# Patient Record
Sex: MALE | Race: WHITE | NOT HISPANIC OR LATINO | Employment: FULL TIME | URBAN - METROPOLITAN AREA
[De-identification: names, ages, dates, MRNs, and addresses within clinical notes are randomized per-mention and may not be internally consistent; named-entity substitution may affect disease eponyms.]

---

## 2017-12-04 ENCOUNTER — ALLSCRIPTS OFFICE VISIT (OUTPATIENT)
Dept: OTHER | Facility: OTHER | Age: 62
End: 2017-12-04

## 2017-12-04 DIAGNOSIS — N40.0 ENLARGED PROSTATE WITHOUT LOWER URINARY TRACT SYMPTOMS (LUTS): ICD-10-CM

## 2017-12-04 LAB
BILIRUB UR QL STRIP: NORMAL
CLARITY UR: NORMAL
COLOR UR: YELLOW
GLUCOSE (HISTORICAL): NORMAL
HGB UR QL STRIP.AUTO: NORMAL
KETONES UR STRIP-MCNC: NORMAL MG/DL
LEUKOCYTE ESTERASE UR QL STRIP: NORMAL
NITRITE UR QL STRIP: NORMAL
PH UR STRIP.AUTO: 5.5 [PH]
PROT UR STRIP-MCNC: NORMAL MG/DL
SP GR UR STRIP.AUTO: 1.02
UROBILINOGEN UR QL STRIP.AUTO: 0.2

## 2018-01-22 VITALS
DIASTOLIC BLOOD PRESSURE: 80 MMHG | SYSTOLIC BLOOD PRESSURE: 136 MMHG | WEIGHT: 205 LBS | HEIGHT: 70 IN | BODY MASS INDEX: 29.35 KG/M2

## 2018-07-17 ENCOUNTER — PROCEDURE VISIT (OUTPATIENT)
Dept: UROLOGY | Facility: MEDICAL CENTER | Age: 63
End: 2018-07-17
Payer: COMMERCIAL

## 2018-07-17 VITALS
SYSTOLIC BLOOD PRESSURE: 110 MMHG | DIASTOLIC BLOOD PRESSURE: 70 MMHG | BODY MASS INDEX: 28.63 KG/M2 | WEIGHT: 200 LBS | HEIGHT: 70 IN

## 2018-07-17 DIAGNOSIS — N13.8 BPH WITH OBSTRUCTION/LOWER URINARY TRACT SYMPTOMS: ICD-10-CM

## 2018-07-17 DIAGNOSIS — N40.1 BPH WITH OBSTRUCTION/LOWER URINARY TRACT SYMPTOMS: ICD-10-CM

## 2018-07-17 DIAGNOSIS — Z85.51 HISTORY OF BLADDER CANCER: Primary | ICD-10-CM

## 2018-07-17 DIAGNOSIS — N52.02 CORPORO-VENOUS OCCLUSIVE ERECTILE DYSFUNCTION: ICD-10-CM

## 2018-07-17 LAB
SL AMB  POCT GLUCOSE, UA: NORMAL
SL AMB LEUKOCYTE ESTERASE,UA: NORMAL
SL AMB POCT BILIRUBIN,UA: NORMAL
SL AMB POCT BLOOD,UA: NORMAL
SL AMB POCT CLARITY,UA: CLEAR
SL AMB POCT COLOR,UA: YELLOW
SL AMB POCT KETONES,UA: NORMAL
SL AMB POCT NITRITE,UA: NORMAL
SL AMB POCT PH,UA: 5
SL AMB POCT SPECIFIC GRAVITY,UA: 1.03
SL AMB POCT URINE PROTEIN: NORMAL
SL AMB POCT UROBILINOGEN: 0.2

## 2018-07-17 PROCEDURE — 88112 CYTOPATH CELL ENHANCE TECH: CPT | Performed by: PATHOLOGY

## 2018-07-17 PROCEDURE — 99214 OFFICE O/P EST MOD 30 MIN: CPT | Performed by: UROLOGY

## 2018-07-17 PROCEDURE — 52000 CYSTOURETHROSCOPY: CPT | Performed by: UROLOGY

## 2018-07-17 PROCEDURE — 81003 URINALYSIS AUTO W/O SCOPE: CPT | Performed by: UROLOGY

## 2018-07-17 RX ORDER — LISINOPRIL 2.5 MG/1
TABLET ORAL
COMMUNITY
Start: 2018-06-21

## 2018-07-17 RX ORDER — ATORVASTATIN CALCIUM 80 MG/1
TABLET, FILM COATED ORAL
COMMUNITY
Start: 2018-06-21

## 2018-07-17 RX ORDER — SULFAMETHOXAZOLE AND TRIMETHOPRIM 800; 160 MG/1; MG/1
1 TABLET ORAL EVERY 12 HOURS SCHEDULED
Qty: 4 TABLET | Refills: 0 | Status: SHIPPED | OUTPATIENT
Start: 2018-07-17 | End: 2018-07-19

## 2018-07-17 RX ORDER — METOPROLOL SUCCINATE 25 MG
TABLET, EXTENDED RELEASE 24 HR ORAL
COMMUNITY
Start: 2018-06-21

## 2018-07-17 RX ORDER — TICAGRELOR 90 MG/1
TABLET ORAL
COMMUNITY
Start: 2018-06-12

## 2018-07-17 NOTE — LETTER
July 17, 2018     Jeanette Penaloza, [address] [facility] [address]    Patient: Ade Lucas   YOB: 1955   Date of Visit: 7/17/2018       Dear Dr Donte Rashid:    Thank you for referring Oseas Langley to me for evaluation  Below are my notes for this consultation  If you have questions, please do not hesitate to call me  I look forward to following your patient along with you  Sincerely,        Jay Fish MD        CC: No Recipients  Jay Fish MD  7/17/2018  4:35 PM  Signed  Assessment/Plan:  1  History of bladder cancer-the patient underwent TURBT in 2014 October at which time high-grade papillary urothelial carcinoma with a rare focus of superficial submucosal invasion without any evidence of muscular invasion was noted  The patient underwent intravesical BCG therapy and now has been followed with yearly cystoscopy and presents for that now  He denies any gross hematuria or urinary symptoms referable to his bladder cancer  2   BPH with obstructive symptoms  The patient has an AUA symptom score of only 7  He however notes a weakened urinary stream some urinary hesitancy intermittency and postvoid dribbling with nocturia twice nightly  We did discuss alpha blockers as well as surgical intervention with lasers TURP or Urolift with the patient being a good candidate for any of these therapies  At this point in time we will hold off on introducing any therapy as he is on anticoagulation and his AUA symptom score is only 7  In a year ultrasound of the prostate will be obtained in order to evaluate prostate size cystoscopy will again be performed for bladder cancer surveillance as well as evaluation of the bladder outlet  At the present time the patient has bilobar enlargement of the lateral lobes of the prostate with slight extravesical median lobe enlargement which is not severe  He would be a good candidate for Uro lift    3  erectile dysfunction    The patient notes a decrease in libido as well as inability to maintain or obtain a good erection capable penetration  The patient and I had a long discussion concerning PDE 5 inhibitors however the patient hold a prescription for nitroglycerin which he has not used  We discussed the contraindication with nitroglycerin and he will approaches cardiologist to discontinue the nitroglycerin in order that he may try PDE 5 inhibitors  If PD 5 inhibitors are not appropriate then injection therapy and/or implant surgery will be discussed at a later date  Testosterone panel will be ordered for next years labs  No problem-specific Assessment & Plan notes found for this encounter  Diagnoses and all orders for this visit:    History of bladder cancer  -     POCT urine dip auto non-scope  -     Cystoscopy; Future  -     Cytology, urine; Future    BPH with obstruction/lower urinary tract symptoms  -     PSA Total, Diagnostic; Future  -     Comprehensive metabolic panel; Future    Corporo-venous occlusive erectile dysfunction  -     Testosterone, free, total; Future  -     Comprehensive metabolic panel; Future    Other orders  -     atorvastatin (LIPITOR) 80 mg tablet;   -     lisinopril (ZESTRIL) 2 5 mg tablet;   -     TOPROL XL 25 MG 24 hr tablet;   -     BRILINTA 90 MG;           Subjective:      Patient ID: Surekha Gardner is a 70 y o  male  Chief complaint:  History of bladder cancer and prostate enlargement with erectile dysfunction    History of present illness 15-year-old male with a past history of high-grade papillary transitional cell carcinoma invasive into the submucosa but not bladder muscle presents for cystoscopy  He denies gross hematuria dysuria frequency and urgency but notes nocturia 2-3 times per night some weakening of the urinary stream with hesitancy intermittency  The patient also notes erectile dysfunction and decreased libido requesting evaluation that regard    The patient is on anticoagulation for atrial fibrillation however he notes that he may be able to come off of that in the near future and further evaluation for BPH and erectile dysfunction will take place after that occurs  The following portions of the patient's history were reviewed and updated as appropriate: allergies, current medications, past family history, past medical history, past social history, past surgical history and problem list     Review of Systems   Constitutional: Negative  HENT: Negative  Eyes: Negative  Respiratory: Negative  Cardiovascular: Negative  Gastrointestinal: Negative  Genitourinary: Negative  Musculoskeletal: Negative  Skin: Negative  Neurological: Negative  Psychiatric/Behavioral: Negative  Objective:      /70   Ht 5' 10" (1 778 m)   Wt 90 7 kg (200 lb)   BMI 28 70 kg/m²           Physical Exam   Constitutional: He is oriented to person, place, and time  He appears well-developed and well-nourished  HENT:   Head: Atraumatic  Eyes: Conjunctivae and EOM are normal    Neck: Neck supple  Pulmonary/Chest: Effort normal  No respiratory distress  Abdominal: Soft  He exhibits no distension  There is no tenderness  There is no rebound  Genitourinary: Penis normal    Neurological: He is alert and oriented to person, place, and time  Psychiatric: He has a normal mood and affect  His behavior is normal  Judgment and thought content normal    Vitals reviewed  Cystoscopy  Date/Time: 7/17/2018 4:33 PM  Performed by: Miya Malin  Authorized by: Miya Malin     Procedure details: cystoscopy    Patient tolerance: Patient tolerated the procedure well with no immediate complications    Additional Procedure Details: Patient was placed in the lithotomy position and his urethral meatus prepped using Betadine with 2% lidocaine lubricant placed in the urethra and left indwelling for 5 minutes    Flexible cystoscopy took place under sterile conditions with the anterior urethra appearing normal without stricture lesion the prostatic urethra showing bilobar enlargement of the lateral lobes of the prostate with visual occlusion of the bladder outlet  There is slight enlargement of the median lobe but this is not severe and not intravesical   Urinary bladder is free of any intrinsic lesions or extrinsic mass compression mildly trabeculated with normal ureteral orifices with respect to position configuration and clear efflux  The patient tolerated the procedure well and will return 1 year

## 2018-07-17 NOTE — PROGRESS NOTES
IPSS Questionnaire (AUA-7): Over the past month    1)  How often have you had a sensation of not emptying your bladder completely after you finish urinating? 1 - Less than 1 time in 5   2)  How often have you had to urinate again less than two hours after you finished urinating? 1 - Less than 1 time in 5   3)  How often have you found you stopped and started again several times when you urinated? 0 - Not at all   4) How difficult have you found it to postpone urination? 1 - Less than 1 time in 5   5) How often have you had a weak urinary stream?  2 - Less than half the time   6) How often have you had to push or strain to begin urination? 1 - Less than 1 time in 5   7) How many times did you most typically get up to urinate from the time you went to bed until the time you got up in the morning?   1 - 1 time   Total Score:  7

## 2018-07-17 NOTE — PROGRESS NOTES
Assessment/Plan:  1  History of bladder cancer-the patient underwent TURBT in 2014 October at which time high-grade papillary urothelial carcinoma with a rare focus of superficial submucosal invasion without any evidence of muscular invasion was noted  The patient underwent intravesical BCG therapy and now has been followed with yearly cystoscopy and presents for that now  He denies any gross hematuria or urinary symptoms referable to his bladder cancer  2   BPH with obstructive symptoms  The patient has an AUA symptom score of only 7  He however notes a weakened urinary stream some urinary hesitancy intermittency and postvoid dribbling with nocturia twice nightly  We did discuss alpha blockers as well as surgical intervention with lasers TURP or Urolift with the patient being a good candidate for any of these therapies  At this point in time we will hold off on introducing any therapy as he is on anticoagulation and his AUA symptom score is only 7  In a year ultrasound of the prostate will be obtained in order to evaluate prostate size cystoscopy will again be performed for bladder cancer surveillance as well as evaluation of the bladder outlet  At the present time the patient has bilobar enlargement of the lateral lobes of the prostate with slight extravesical median lobe enlargement which is not severe  He would be a good candidate for Uro lift    3  erectile dysfunction  The patient notes a decrease in libido as well as inability to maintain or obtain a good erection capable penetration  The patient and I had a long discussion concerning PDE 5 inhibitors however the patient hold a prescription for nitroglycerin which he has not used  We discussed the contraindication with nitroglycerin and he will approaches cardiologist to discontinue the nitroglycerin in order that he may try PDE 5 inhibitors    If PD 5 inhibitors are not appropriate then injection therapy and/or implant surgery will be discussed at a later date  Testosterone panel will be ordered for next years labs  No problem-specific Assessment & Plan notes found for this encounter  Diagnoses and all orders for this visit:    History of bladder cancer  -     POCT urine dip auto non-scope  -     Cystoscopy; Future  -     Cytology, urine; Future    BPH with obstruction/lower urinary tract symptoms  -     PSA Total, Diagnostic; Future  -     Comprehensive metabolic panel; Future    Corporo-venous occlusive erectile dysfunction  -     Testosterone, free, total; Future  -     Comprehensive metabolic panel; Future    Other orders  -     atorvastatin (LIPITOR) 80 mg tablet;   -     lisinopril (ZESTRIL) 2 5 mg tablet;   -     TOPROL XL 25 MG 24 hr tablet;   -     BRILINTA 90 MG;           Subjective:      Patient ID: Bandar Diaz is a 70 y o  male  Chief complaint:  History of bladder cancer and prostate enlargement with erectile dysfunction    History of present illness 78-year-old male with a past history of high-grade papillary transitional cell carcinoma invasive into the submucosa but not bladder muscle presents for cystoscopy  He denies gross hematuria dysuria frequency and urgency but notes nocturia 2-3 times per night some weakening of the urinary stream with hesitancy intermittency  The patient also notes erectile dysfunction and decreased libido requesting evaluation that regard  The patient is on anticoagulation for atrial fibrillation however he notes that he may be able to come off of that in the near future and further evaluation for BPH and erectile dysfunction will take place after that occurs  The following portions of the patient's history were reviewed and updated as appropriate: allergies, current medications, past family history, past medical history, past social history, past surgical history and problem list     Review of Systems   Constitutional: Negative  HENT: Negative  Eyes: Negative  Respiratory: Negative  Cardiovascular: Negative  Gastrointestinal: Negative  Genitourinary: Negative  Musculoskeletal: Negative  Skin: Negative  Neurological: Negative  Psychiatric/Behavioral: Negative  Objective:      /70   Ht 5' 10" (1 778 m)   Wt 90 7 kg (200 lb)   BMI 28 70 kg/m²          Physical Exam   Constitutional: He is oriented to person, place, and time  He appears well-developed and well-nourished  HENT:   Head: Atraumatic  Eyes: Conjunctivae and EOM are normal    Neck: Neck supple  Pulmonary/Chest: Effort normal  No respiratory distress  Abdominal: Soft  He exhibits no distension  There is no tenderness  There is no rebound  Genitourinary: Penis normal    Neurological: He is alert and oriented to person, place, and time  Psychiatric: He has a normal mood and affect  His behavior is normal  Judgment and thought content normal    Vitals reviewed  Cystoscopy  Date/Time: 7/17/2018 4:33 PM  Performed by: Sophia Barcenas  Authorized by: Sophia Barcenas     Procedure details: cystoscopy    Patient tolerance: Patient tolerated the procedure well with no immediate complications    Additional Procedure Details: Patient was placed in the lithotomy position and his urethral meatus prepped using Betadine with 2% lidocaine lubricant placed in the urethra and left indwelling for 5 minutes  Flexible cystoscopy took place under sterile conditions with the anterior urethra appearing normal without stricture lesion the prostatic urethra showing bilobar enlargement of the lateral lobes of the prostate with visual occlusion of the bladder outlet  There is slight enlargement of the median lobe but this is not severe and not intravesical   Urinary bladder is free of any intrinsic lesions or extrinsic mass compression mildly trabeculated with normal ureteral orifices with respect to position configuration and clear efflux    The patient tolerated the procedure well and will return 1 year

## 2019-09-03 ENCOUNTER — PROCEDURE VISIT (OUTPATIENT)
Dept: UROLOGY | Facility: MEDICAL CENTER | Age: 64
End: 2019-09-03
Payer: COMMERCIAL

## 2019-09-03 VITALS — HEIGHT: 71 IN | WEIGHT: 206 LBS | BODY MASS INDEX: 28.84 KG/M2

## 2019-09-03 DIAGNOSIS — N52.03 COMBINED ARTERIAL INSUFFICIENCY AND CORPORO-VENOUS OCCLUSIVE ERECTILE DYSFUNCTION: ICD-10-CM

## 2019-09-03 DIAGNOSIS — N40.1 BPH WITH OBSTRUCTION/LOWER URINARY TRACT SYMPTOMS: Primary | ICD-10-CM

## 2019-09-03 DIAGNOSIS — Z85.51 HISTORY OF BLADDER CANCER: ICD-10-CM

## 2019-09-03 DIAGNOSIS — N13.8 BPH WITH OBSTRUCTION/LOWER URINARY TRACT SYMPTOMS: Primary | ICD-10-CM

## 2019-09-03 LAB
POST-VOID RESIDUAL VOLUME, ML POC: 59 ML
SL AMB  POCT GLUCOSE, UA: ABNORMAL
SL AMB LEUKOCYTE ESTERASE,UA: ABNORMAL
SL AMB POCT BILIRUBIN,UA: ABNORMAL
SL AMB POCT BLOOD,UA: ABNORMAL
SL AMB POCT CLARITY,UA: CLEAR
SL AMB POCT COLOR,UA: YELLOW
SL AMB POCT KETONES,UA: ABNORMAL
SL AMB POCT NITRITE,UA: ABNORMAL
SL AMB POCT PH,UA: 5
SL AMB POCT SPECIFIC GRAVITY,UA: 1.02
SL AMB POCT URINE PROTEIN: ABNORMAL
SL AMB POCT UROBILINOGEN: 0.2

## 2019-09-03 PROCEDURE — 99214 OFFICE O/P EST MOD 30 MIN: CPT | Performed by: UROLOGY

## 2019-09-03 PROCEDURE — 52000 CYSTOURETHROSCOPY: CPT | Performed by: UROLOGY

## 2019-09-03 PROCEDURE — 51798 US URINE CAPACITY MEASURE: CPT | Performed by: UROLOGY

## 2019-09-03 PROCEDURE — 81003 URINALYSIS AUTO W/O SCOPE: CPT | Performed by: UROLOGY

## 2019-09-03 PROCEDURE — 76872 US TRANSRECTAL: CPT | Performed by: UROLOGY

## 2019-09-03 PROCEDURE — 51741 ELECTRO-UROFLOWMETRY FIRST: CPT | Performed by: UROLOGY

## 2019-09-03 RX ORDER — SILDENAFIL 100 MG/1
100 TABLET, FILM COATED ORAL DAILY PRN
Qty: 10 TABLET | Refills: 0 | Status: SHIPPED | OUTPATIENT
Start: 2019-09-03

## 2019-09-03 RX ORDER — SULFAMETHOXAZOLE AND TRIMETHOPRIM 800; 160 MG/1; MG/1
1 TABLET ORAL EVERY 12 HOURS SCHEDULED
Qty: 4 TABLET | Refills: 0 | Status: SHIPPED | OUTPATIENT
Start: 2019-09-03 | End: 2019-09-05

## 2019-09-03 NOTE — PROGRESS NOTES
Cystoscopy  Date/Time: 9/3/2019 4:13 PM  Performed by: Kadeem Powell MD  Authorized by: Kadeem Powell MD     Procedure details: cystoscopy    Patient tolerance: Patient tolerated the procedure well with no immediate complications    Additional Procedure Details: The patient was placed on the examining table and his urethral meatus prepped with Betadine with 2% lidocaine lubricant instilled in the urethral lumen for 5 minutes  After 5 minutes video cystourethroscopy took place  The anterior urethra was within normal limits without stricture lesion  The prostatic urethra revealed bilobar enlargement of the lateral lobes of the prostate with visual occlusion of the bladder outlet  There was slight median lobe enlargement that did not appear to be obstructive  Urinary bladder was moderately trabeculated with a posterior wall cellule no evidence of intrinsic lesion or extrinsic mass compression  Both ureteral orifices were normal position and configuration bilaterally with clear efflux bilaterally  Approximately 4 Uro lift implants would be appropriate for this prostate  The patient tolerated cystourethroscopy without side effect or complication  His bladder was left filled with 500 cc of saline for uroflow to be performed he tolerated the procedure well    The patient was then placed in the right flank up position and transrectal ultrasound probe with a condom in lubricant was placed from the anal verge into the rectal vault  Prostate was examined and there were no abnormalities in the peripheral zone  There are no hypoechoic peripheral zone lesions  There is mild calcifications noted in the periurethral zone  There was mild median lobe enlargement but this was not severe  Transverse prostatic diameter was 48 mm with prostate size 41 cc  The patient tolerated both procedures well

## 2019-09-03 NOTE — PROGRESS NOTES
Uroflow  Date/Time: 9/3/2019 4:15 PM  Performed by: Francisco Whelan MD  Authorized by: Francisco Whelan MD   Procedure - Urodynamics:  Procedure details: uroflow          Post-procedure:     Patient tolerance: Patient tolerated procedure well with no immediate complications      Comments:      Patient voided a total of 446 5 cc with a maximum rate of 11 4 and an average rate of 4 9 cc/second  Flow rates are both sub normal   PVR was 59 cc    Flow rates are sub normal but PVRs acceptable

## 2019-09-03 NOTE — PROGRESS NOTES
Assessment/Plan:   1  BPH with lower urinary tract symptoms-the patient has an AUA symptom score of 11 has bilobar enlargement of the lateral lobes of the prostate with visual occlusion of the bladder outlet on cystoscopy with slight median lobe but not obstructive median lobe enlargement  Patient has a prostate size of approximately 41 cc and acceptable transverse diameter of the prostate with a sub normal urinary flow rate-both average and maximum within acceptable PVR of 59 cc  The patient and I had a long discussion concerning options for therapy including alpha blockade 5 alpha reductase inhibition Uro lift TURP or laser TURP  The patient is interested in Uro lift and wishes to consider this at the end of 2019  He will return for paperwork in that regard  2  Erectile dysfunction secondary to arterial in veno-occlusive dysfunction-the patient will trial sildenafil 100 mg  He was cautioned about not combining this with nitroglycerin as this could be potentially fatal   Free and total testosterone will be obtained  3  History of bladder cancer-cystourethroscopy revealed no evidence of recurrent lesions  Diagnoses and all orders for this visit:    BPH with obstruction/lower urinary tract symptoms  -     POCT urine dip auto non-scope  -     sulfamethoxazole-trimethoprim (BACTRIM DS) 800-160 mg per tablet; Take 1 tablet by mouth every 12 (twelve) hours for 2 days  -     Uroflow  -     POCT Measure PVR  -     Testosterone, free, total; Future    History of bladder cancer  -     Cystoscopy    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 dysfunction  -     Testosterone, free, total; Future  -     sildenafil (VIAGRA) 100 mg tablet; Take 1 tablet (100 mg total) by mouth daily as needed for erectile dysfunction          Subjective:     Patient ID: Julian Manzo is a 75 y o  male      HPI  57-year-old male with a history of bladder cancer presents for surveillance cystoscopy as well as for evaluation of bladder outlet obstructive symptoms noting an AUA symptom score of 11 primarily a longer slower protracted urinary stream with feelings of incomplete bladder emptying and intermittency although he does admit increasing urinary urgency and now the development of nocturia once nightly  In addition the patient complains of difficulty a getting any usable erections noting a decreased erectile rigidity early loss of erection and often difficulty even obtaining an erection  The patient does note some nighttime erectile activity during sleep  He denies dysuria gross hematuria  Patient presents for evaluation of the above  Review of Systems   Constitutional: Negative  HENT: Negative  Eyes: Negative  Respiratory: Negative  Cardiovascular: Negative  Gastrointestinal: Negative  Genitourinary: Positive for urgency  Negative for frequency  See HPI an AUA symptom score   Musculoskeletal: Negative  Skin: Negative  Neurological: Negative  Psychiatric/Behavioral: Negative  Objective:     Physical Exam   Constitutional: He is oriented to person, place, and time  He appears well-nourished  No distress  HENT:   Head: Normocephalic and atraumatic  Eyes: EOM are normal    Neck: Neck supple  Pulmonary/Chest: Effort normal  No respiratory distress  Abdominal: Soft  Genitourinary: Penis normal    Genitourinary Comments: Phallus normal without cutaneous lesions  Meatus patent normally placed  Scrotum normal without cutaneous lesions  Testes adnexa palpably normal without masses or induration  COURTNEY reveals a 40 g prostate without nodularity or tenderness no rectal masses and normal anal sphincter tone with normal anal verge  Neurological: He is alert and oriented to person, place, and time  Psychiatric: He has a normal mood and affect  His behavior is normal  Judgment and thought content normal    Vitals reviewed

## 2019-09-03 NOTE — LETTER
September 3, 2019     Diann Cornea, [address] [facility] [address]    Patient: Lui Haney   YOB: 1955   Date of Visit: 9/3/2019       Dear Dr Mike Lane:    Thank you for referring Elida Mejias to me for evaluation  Below are my notes for this consultation  If you have questions, please do not hesitate to call me  I look forward to following your patient along with you  Sincerely,        Amanda Sandoval MD        CC: No Recipients  Amanda Sandoval MD  9/3/2019  4:24 PM  Sign at close encounter  Uroflow  Date/Time: 9/3/2019 4:15 PM  Performed by: Amanda Sandoval MD  Authorized by: Amanda Sandoval MD   Procedure - Urodynamics:  Procedure details: uroflow          Post-procedure:     Patient tolerance: Patient tolerated procedure well with no immediate complications      Comments:      Patient voided a total of 446 5 cc with a maximum rate of 11 4 and an average rate of 4 9 cc/second  Flow rates are both sub normal   PVR was 59 cc  Flow rates are sub normal but PVRs acceptable            Amanda Sandoval MD  9/3/2019  4:35 PM  Sign at close encounter  Cystoscopy  Date/Time: 9/3/2019 4:13 PM  Performed by: Amanda Sandoavl MD  Authorized by: Amanda Sandoval MD     Procedure details: cystoscopy    Patient tolerance: Patient tolerated the procedure well with no immediate complications    Additional Procedure Details: The patient was placed on the examining table and his urethral meatus prepped with Betadine with 2% lidocaine lubricant instilled in the urethral lumen for 5 minutes  After 5 minutes video cystourethroscopy took place  The anterior urethra was within normal limits without stricture lesion  The prostatic urethra revealed bilobar enlargement of the lateral lobes of the prostate with visual occlusion of the bladder outlet  There was slight median lobe enlargement that did not appear to be obstructive    Urinary bladder was moderately trabeculated with a posterior wall cellule no evidence of intrinsic lesion or extrinsic mass compression  Both ureteral orifices were normal position and configuration bilaterally with clear efflux bilaterally  Approximately 4 Uro lift implants would be appropriate for this prostate  The patient tolerated cystourethroscopy without side effect or complication  His bladder was left filled with 500 cc of saline for uroflow to be performed he tolerated the procedure well    The patient was then placed in the right flank up position and transrectal ultrasound probe with a condom in lubricant was placed from the anal verge into the rectal vault  Prostate was examined and there were no abnormalities in the peripheral zone  There are no hypoechoic peripheral zone lesions  There is mild calcifications noted in the periurethral zone  There was mild median lobe enlargement but this was not severe  Transverse prostatic diameter was 48 mm with prostate size 41 cc  The patient tolerated both procedures well

## 2020-03-25 ENCOUNTER — MOHS SURGERY-ROUTINE (OUTPATIENT)
Dept: URBAN - METROPOLITAN AREA CLINIC 12 | Facility: CLINIC | Age: 65
Setting detail: DERMATOLOGY
End: 2020-03-25

## 2020-03-25 DIAGNOSIS — L57.0 ACTINIC KERATOSIS: ICD-10-CM

## 2020-03-25 PROCEDURE — 17311 MOHS 1 STAGE H/N/HF/G: CPT

## 2020-03-25 PROCEDURE — 13132 CMPLX RPR F/C/C/M/N/AX/G/H/F: CPT

## 2020-04-02 ENCOUNTER — MOHS SURGERY-ROUTINE (OUTPATIENT)
Dept: URBAN - METROPOLITAN AREA CLINIC 12 | Facility: CLINIC | Age: 65
Setting detail: DERMATOLOGY
End: 2020-04-02

## 2020-04-02 DIAGNOSIS — L71.8 OTHER ROSACEA: ICD-10-CM

## 2020-04-02 PROCEDURE — 13132 CMPLX RPR F/C/C/M/N/AX/G/H/F: CPT

## 2020-04-02 PROCEDURE — 17312 MOHS ADDL STAGE: CPT

## 2020-04-02 PROCEDURE — 17311 MOHS 1 STAGE H/N/HF/G: CPT

## 2022-10-31 ENCOUNTER — TELEPHONE (OUTPATIENT)
Dept: UROLOGY | Facility: MEDICAL CENTER | Age: 67
End: 2022-10-31

## 2022-11-09 ENCOUNTER — OFFICE VISIT (OUTPATIENT)
Dept: UROLOGY | Facility: MEDICAL CENTER | Age: 67
End: 2022-11-09

## 2022-11-09 VITALS
OXYGEN SATURATION: 95 % | BODY MASS INDEX: 28.28 KG/M2 | HEART RATE: 50 BPM | WEIGHT: 202 LBS | SYSTOLIC BLOOD PRESSURE: 142 MMHG | HEIGHT: 71 IN | DIASTOLIC BLOOD PRESSURE: 80 MMHG

## 2022-11-09 DIAGNOSIS — N13.8 BPH WITH OBSTRUCTION/LOWER URINARY TRACT SYMPTOMS: ICD-10-CM

## 2022-11-09 DIAGNOSIS — Z12.5 PROSTATE CANCER SCREENING: ICD-10-CM

## 2022-11-09 DIAGNOSIS — N52.03 COMBINED ARTERIAL INSUFFICIENCY AND CORPORO-VENOUS OCCLUSIVE ERECTILE DYSFUNCTION: Primary | ICD-10-CM

## 2022-11-09 DIAGNOSIS — Z85.51 HISTORY OF BLADDER CANCER: ICD-10-CM

## 2022-11-09 DIAGNOSIS — N40.1 BPH WITH OBSTRUCTION/LOWER URINARY TRACT SYMPTOMS: ICD-10-CM

## 2022-11-09 PROBLEM — C67.2 MALIGNANT NEOPLASM OF LATERAL WALL OF BLADDER (HCC): Status: ACTIVE | Noted: 2022-11-09

## 2022-11-09 RX ORDER — TADALAFIL 20 MG/1
20 TABLET ORAL AS NEEDED
Qty: 10 TABLET | Refills: 0 | Status: SHIPPED | OUTPATIENT
Start: 2022-11-09

## 2022-11-09 RX ORDER — LATANOPROST 50 UG/ML
SOLUTION/ DROPS OPHTHALMIC
COMMUNITY
Start: 2022-10-06

## 2022-11-09 NOTE — PROGRESS NOTES
Assessment/Plan:   1  Erectile dysfunction-patient no longer responds adequately to Viagra 100 mg as needed and will give a trial of Cialis 20 mg as needed with prescription issued  If this does not work the patient will purchase alprostadil for trial of vaso active drug injection therapy  2  BPH with obstructive symptoms-AUA symptom score is only 10 however the patient has a significantly enlarged prostate and this will be monitored  PSA is within normal limits as COURTNEY is without suspicion of malignant lesions  3  Prostate cancer screening-as above in 2     4  History of bladder cancer-plan urinary cytology and cystoscopy  No problem-specific Assessment & Plan notes found for this encounter  Diagnoses and all orders for this visit:    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 dysfunction  -     Alprostadil, Vasodilator, (PROSTAGLANDIN PGE1 INJECTABLE 20 MCG/ML, STANDARD, IJ SOLN); 1 mL by Intracavernosal route once for 1 dose  -     tadalafil (CIALIS) 20 MG tablet; Take 1 tablet (20 mg total) by mouth as needed for erectile dysfunction    BPH with obstruction/lower urinary tract symptoms    Prostate cancer screening    History of bladder cancer  -     Cystoscopy; Future  -     Cytology, urine; Future    Other orders  -     latanoprost (XALATAN) 0 005 % ophthalmic solution; INSTILL 1 DROP INTO BOTH EYES IN THE EVENING          Subjective:      Patient ID: Aleksandar Harding is a 69 y o  male  HPI  68-year-old male with erectile dysfunction no longer responding to Viagra 100 mg especially since he has been placed on a variety of antihypertensives  Patient has a history of coronary artery disease and had a heart attack and is currently on Brilinta  The heart attack was 5 years ago any is been stable since  The patient presents for alternative therapy for erectile dysfunction    AUA symptom score is 10 with the patient having an enlarged prostate by history and history of bladder cancer with previous TURBT  The patient will also undergo surveillance cystoscopy  The following portions of the patient's history were reviewed and updated as appropriate: allergies, current medications, past family history, past medical history, past social history, past surgical history and problem list     Review of Systems   Genitourinary:        AUA symptom score of 10 with nocturia once nightly 3/5 weakening of the urinary stream and 2/5 urgency   Musculoskeletal: Positive for myalgias  All other systems reviewed and are negative  Objective:      /80   Pulse (!) 50   Ht 5' 11" (1 803 m)   Wt 91 6 kg (202 lb)   SpO2 95%   BMI 28 17 kg/m²          Physical Exam  Vitals reviewed  Constitutional:       General: He is not in acute distress  Appearance: Normal appearance  He is not ill-appearing, toxic-appearing or diaphoretic  HENT:      Head: Normocephalic and atraumatic  Nose: Nose normal       Mouth/Throat:      Mouth: Mucous membranes are moist    Eyes:      Extraocular Movements: Extraocular movements intact  Pulmonary:      Effort: Pulmonary effort is normal  No respiratory distress  Abdominal:      Palpations: Abdomen is soft  Genitourinary:     Comments: Phallus normal circumcised without cutaneous lesions  Meatus patent normally placed  No palpable Peyronie's plaques  Scrotum normal without cutaneous lesions  Testes adnexa palpably normal without masses or induration or fluid collections that are palpable  COURTNEY normal anal verge normal anal sphincter tone no palpable rectal masses  Prostate about 45 g a nodular nontender  Musculoskeletal:      Cervical back: Neck supple  Skin:     General: Skin is dry  Neurological:      Mental Status: He is alert and oriented to person, place, and time  Psychiatric:         Mood and Affect: Mood normal          Behavior: Behavior normal          Thought Content:  Thought content normal          Judgment: Judgment normal

## 2022-11-11 ENCOUNTER — TELEPHONE (OUTPATIENT)
Dept: UROLOGY | Facility: AMBULATORY SURGERY CENTER | Age: 67
End: 2022-11-11

## 2022-11-11 NOTE — TELEPHONE ENCOUNTER
Hello,    · Was going through the patient insurance information and got this error message  I went to [employer] and checked patient [place] [employer] Hiram insurance ended at the end of August   Please have patient call the number below to inform medicare about the fact the should be primary  ·   ·   ·  Medicare RTE response indicates Medicare is secondary; Disabled/Working Aged Beneficiary or spouse with employer group health plan but [facility]   Advise patient to call Medicare 1 [phone] to update no

## 2023-01-16 ENCOUNTER — TELEPHONE (OUTPATIENT)
Dept: UROLOGY | Facility: MEDICAL CENTER | Age: 68
End: 2023-01-16

## 2023-01-16 NOTE — TELEPHONE ENCOUNTER
LM for pt to call our office  Pt has appt for cystoscopy and possible alprostadil inj  Need to confirm if he is aware that he needs to  meds  Wife said, he will call back because he may need to reschedule his appt

## 2023-03-30 ENCOUNTER — PROCEDURE VISIT (OUTPATIENT)
Dept: UROLOGY | Facility: MEDICAL CENTER | Age: 68
End: 2023-03-30

## 2023-03-30 VITALS
HEIGHT: 71 IN | WEIGHT: 205 LBS | OXYGEN SATURATION: 95 % | DIASTOLIC BLOOD PRESSURE: 82 MMHG | BODY MASS INDEX: 28.7 KG/M2 | SYSTOLIC BLOOD PRESSURE: 140 MMHG | HEART RATE: 45 BPM

## 2023-03-30 DIAGNOSIS — N13.8 BPH WITH OBSTRUCTION/LOWER URINARY TRACT SYMPTOMS: Primary | ICD-10-CM

## 2023-03-30 DIAGNOSIS — N52.03 COMBINED ARTERIAL INSUFFICIENCY AND CORPORO-VENOUS OCCLUSIVE ERECTILE DYSFUNCTION: ICD-10-CM

## 2023-03-30 DIAGNOSIS — Z12.5 PROSTATE CANCER SCREENING: ICD-10-CM

## 2023-03-30 DIAGNOSIS — N40.1 BPH WITH OBSTRUCTION/LOWER URINARY TRACT SYMPTOMS: Primary | ICD-10-CM

## 2023-03-30 DIAGNOSIS — Z85.51 HISTORY OF BLADDER CANCER: ICD-10-CM

## 2023-03-30 LAB
SL AMB  POCT GLUCOSE, UA: ABNORMAL
SL AMB LEUKOCYTE ESTERASE,UA: ABNORMAL
SL AMB POCT BILIRUBIN,UA: ABNORMAL
SL AMB POCT BLOOD,UA: ABNORMAL
SL AMB POCT CLARITY,UA: CLEAR
SL AMB POCT COLOR,UA: YELLOW
SL AMB POCT KETONES,UA: ABNORMAL
SL AMB POCT NITRITE,UA: ABNORMAL
SL AMB POCT PH,UA: 5.5
SL AMB POCT SPECIFIC GRAVITY,UA: 1.02
SL AMB POCT URINE PROTEIN: ABNORMAL
SL AMB POCT UROBILINOGEN: 0.2

## 2023-03-30 RX ORDER — CLOPIDOGREL BISULFATE 75 MG/1
75 TABLET ORAL DAILY
COMMUNITY
Start: 2022-11-16 | End: 2023-11-16

## 2023-03-30 RX ORDER — SILDENAFIL 50 MG/1
50 TABLET, FILM COATED ORAL
COMMUNITY
Start: 2023-03-24 | End: 2024-03-23

## 2023-03-30 NOTE — PROGRESS NOTES
Assessment/Plan:  #1  BPH with lower   tract obstructive symptoms- AUA symptom score stable and low-patient satisfied with voiding pattern  2   History of bladder cancer- cystoscopy negative for recurrent lesions with scarring of the urinary bladder and sites of previous BCG therapy noted    3  Prostate cancer screening-patient has not had a PSA since May 2021  This will be repeated now and in 1 year with COURTNEY  Present COURTNEY not suspicious  4 erectile dysfunction-responsive to sildenafil 20 mg on demand  No problem-specific Assessment & Plan notes found for this encounter  Diagnoses and all orders for this visit:    BPH with obstruction/lower urinary tract symptoms  -     POCT urine dip auto non-scope    History of bladder cancer  -     POCT urine dip auto non-scope  -     Cytology, urine    Prostate cancer screening    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 dysfunction    Other orders  -     sildenafil (VIAGRA) 50 MG tablet; Take 50 mg by mouth  -     clopidogrel (PLAVIX) 75 mg tablet; Take 75 mg by mouth daily  -     Glucosamine-Chondroit-Vit C-Mn (GLUCOSAMINE 1500 COMPLEX PO); Glucosamine          Subjective:      Patient ID: Shanda Polo is a 69 y o  male  HPI  49-year-old male with erectile dysfunction that now currently responds to Cialis 20 mg on demand with a history of coronary artery disease previous MI and on Brilinta  The patient had a history of TURBT for noninvasive bladder cancer and denies any gross hematuria or dysuria presenting for cystoscopy  The patient has not had a PSA since 2021 and that will also be repeated with COURTNEY today as well  The following portions of the patient's history were reviewed and updated as appropriate: allergies, current medications, past family history, past medical history, past social history, past surgical history and problem list     Review of Systems   Genitourinary: Positive for frequency          AUA symptom score "10   All other systems reviewed and are negative  Objective:      /82 (BP Location: Left arm, Patient Position: Sitting)   Pulse (!) 45   Ht 5' 11\" (1 803 m)   Wt 93 kg (205 lb)   SpO2 95%   BMI 28 59 kg/m²          Physical Exam  Vitals reviewed  Constitutional:       General: He is not in acute distress  Appearance: Normal appearance  He is normal weight  He is not ill-appearing, toxic-appearing or diaphoretic  HENT:      Head: Normocephalic and atraumatic  Nose: Nose normal       Mouth/Throat:      Mouth: Mucous membranes are moist    Eyes:      Extraocular Movements: Extraocular movements intact  Cardiovascular:      Heart sounds: Normal heart sounds  Pulmonary:      Effort: No respiratory distress  Abdominal:      General: There is no distension  Palpations: Abdomen is soft  There is no mass  Tenderness: There is no abdominal tenderness  There is no guarding or rebound  Genitourinary:     Penis: Normal        Prostate: Normal       Rectum: Normal    Musculoskeletal:         General: Normal range of motion  Cervical back: Neck supple  Neurological:      General: No focal deficit present  Mental Status: He is alert and oriented to person, place, and time  Psychiatric:         Mood and Affect: Mood normal          Behavior: Behavior normal          Thought Content: Thought content normal          Judgment: Judgment normal               Cystoscopy     Date/Time 3/30/2023 2:43 PM     Performed by  Liz Mcgowan MD     Authorized by Liz Mcgowan MD      Universal Protocol:  Consent: Verbal consent obtained    Risks and benefits: risks, benefits and alternatives were discussed  Consent given by: patient  Patient understanding: patient states understanding of the procedure being performed  Patient consent: the patient's understanding of the procedure matches consent given  Procedure consent: procedure consent matches procedure scheduled  Required " items: required blood products, implants, devices, and special equipment available  Patient identity confirmed: verbally with patient        Procedure Details:  Procedure type: cystoscopy    Patient tolerance: Patient tolerated the procedure well with no immediate complications    Additional Procedure Details: With the patient in supine position after placing 2% lidocaine lubricant per urethra and leaving indwelling for 5 minutes flexible video cystourethroscopy took place revealing a normal anterior urethra without stricture or lesion  The prostatic urethra revealed bilobar enlargement of the lateral lobes of the prostate slight median lobe enlargement but no visual occlusion of the bladder outlet  Urinary bladder was moderately trabeculated without intrinsic lesion or extrinsic mass compression  Chronic cystic changes were noted consistent with previous intravesical BCG  Retroflexion reveals no lesions around the bladder neck  The scope was removed atraumatically and the patient recovered uneventfully

## 2023-03-30 NOTE — LETTER
March 30, 2023     Damon Jovel, [address] [facility] [address]    Patient: Tracey Rosario   YOB: 1955   Date of Visit: 3/30/2023       Dear Dr Raul Villafana:    Thank you for referring Silvestre Harrell to me for evaluation  Below are my notes for this consultation  If you have questions, please do not hesitate to call me  I look forward to following your patient along with you  Sincerely,        Patt Sethi MD        CC: No Recipients  Patt Sethi MD  3/30/2023  2:44 PM  Sign when Signing Visit  Assessment/Plan:  #1  BPH with lower   tract obstructive symptoms- AUA symptom score stable and low-patient satisfied with voiding pattern  2   History of bladder cancer- cystoscopy negative for recurrent lesions with scarring of the urinary bladder and sites of previous BCG therapy noted    3  Prostate cancer screening-patient has not had a PSA since May 2021  This will be repeated now and in 1 year with COURTNEY  Present COURTNEY not suspicious  4 erectile dysfunction-responsive to sildenafil 20 mg on demand  No problem-specific Assessment & Plan notes found for this encounter  Diagnoses and all orders for this visit:    BPH with obstruction/lower urinary tract symptoms  -     POCT urine dip auto non-scope    History of bladder cancer  -     POCT urine dip auto non-scope  -     Cytology, urine    Prostate cancer screening    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 dysfunction    Other orders  -     sildenafil (VIAGRA) 50 MG tablet; Take 50 mg by mouth  -     clopidogrel (PLAVIX) 75 mg tablet; Take 75 mg by mouth daily  -     Glucosamine-Chondroit-Vit C-Mn (GLUCOSAMINE 1500 COMPLEX PO); Glucosamine         Subjective:     Patient ID: Tracey Rosario is a 69 y o  male  HPI  53-year-old male with erectile dysfunction that now currently responds to Cialis 20 mg on demand with a history of coronary artery disease previous MI and on Brilinta  "The patient had a history of TURBT for noninvasive bladder cancer and denies any gross hematuria or dysuria presenting for cystoscopy  The patient has not had a PSA since 2021 and that will also be repeated with COURTNEY today as well  The following portions of the patient's history were reviewed and updated as appropriate: allergies, current medications, past family history, past medical history, past social history, past surgical history and problem list     Review of Systems   Genitourinary: Positive for frequency  AUA symptom score 10   All other systems reviewed and are negative  Objective:      /82 (BP Location: Left arm, Patient Position: Sitting)   Pulse (!) 45   Ht 5' 11\" (1 803 m)   Wt 93 kg (205 lb)   SpO2 95%   BMI 28 59 kg/m²         Physical Exam  Vitals reviewed  Constitutional:       General: He is not in acute distress  Appearance: Normal appearance  He is normal weight  He is not ill-appearing, toxic-appearing or diaphoretic  HENT:      Head: Normocephalic and atraumatic  Nose: Nose normal       Mouth/Throat:      Mouth: Mucous membranes are moist    Eyes:      Extraocular Movements: Extraocular movements intact  Cardiovascular:      Heart sounds: Normal heart sounds  Pulmonary:      Effort: No respiratory distress  Abdominal:      General: There is no distension  Palpations: Abdomen is soft  There is no mass  Tenderness: There is no abdominal tenderness  There is no guarding or rebound  Genitourinary:     Penis: Normal        Prostate: Normal       Rectum: Normal    Musculoskeletal:         General: Normal range of motion  Cervical back: Neck supple  Neurological:      General: No focal deficit present  Mental Status: He is alert and oriented to person, place, and time  Psychiatric:         Mood and Affect: Mood normal          Behavior: Behavior normal          Thought Content:  Thought content normal          Judgment: Judgment " normal              Cystoscopy     Date/Time 3/30/2023 2:43 PM     Performed by  Ian Edwards MD     Authorized by Ian Edwards MD      Universal Protocol:  Consent: Verbal consent obtained  Risks and benefits: risks, benefits and alternatives were discussed  Consent given by: patient  Patient understanding: patient states understanding of the procedure being performed  Patient consent: the patient's understanding of the procedure matches consent given  Procedure consent: procedure consent matches procedure scheduled  Required items: required blood products, implants, devices, and special equipment available  Patient identity confirmed: verbally with patient        Procedure Details:  Procedure type: cystoscopy    Patient tolerance: Patient tolerated the procedure well with no immediate complications    Additional Procedure Details: With the patient in supine position after placing 2% lidocaine lubricant per urethra and leaving indwelling for 5 minutes flexible video cystourethroscopy took place revealing a normal anterior urethra without stricture or lesion  The prostatic urethra revealed bilobar enlargement of the lateral lobes of the prostate slight median lobe enlargement but no visual occlusion of the bladder outlet  Urinary bladder was moderately trabeculated without intrinsic lesion or extrinsic mass compression  Chronic cystic changes were noted consistent with previous intravesical BCG  Retroflexion reveals no lesions around the bladder neck  The scope was removed atraumatically and the patient recovered uneventfully

## 2023-09-14 DIAGNOSIS — N52.03 COMBINED ARTERIAL INSUFFICIENCY AND CORPORO-VENOUS OCCLUSIVE ERECTILE DYSFUNCTION: ICD-10-CM

## 2023-09-14 RX ORDER — TADALAFIL 20 MG/1
20 TABLET ORAL AS NEEDED
Qty: 10 TABLET | Refills: 0 | Status: SHIPPED | OUTPATIENT
Start: 2023-09-14

## 2023-09-14 NOTE — TELEPHONE ENCOUNTER
Reason for call:   [x] Refill   [] Prior Auth  [] Other:     Office:   [] PCP/Provider -   [x] Speciality/Provider - urology, Dr Henny Story    Medication: Cialis      Dose/Frequency: 20 mg, 1 prn     Quantity: 10    Pharm[facility]: [facility]

## 2024-12-26 ENCOUNTER — PROCEDURE VISIT (OUTPATIENT)
Dept: UROLOGY | Facility: MEDICAL CENTER | Age: 69
End: 2024-12-26
Payer: MEDICARE

## 2024-12-26 VITALS
DIASTOLIC BLOOD PRESSURE: 80 MMHG | WEIGHT: 205 LBS | HEIGHT: 71 IN | HEART RATE: 70 BPM | BODY MASS INDEX: 28.7 KG/M2 | OXYGEN SATURATION: 97 % | SYSTOLIC BLOOD PRESSURE: 120 MMHG

## 2024-12-26 DIAGNOSIS — Z85.51 HISTORY OF BLADDER CANCER: Primary | ICD-10-CM

## 2024-12-26 DIAGNOSIS — N52.03 COMBINED ARTERIAL INSUFFICIENCY AND CORPORO-VENOUS OCCLUSIVE ERECTILE DYSFUNCTION: ICD-10-CM

## 2024-12-26 DIAGNOSIS — Z12.5 PROSTATE CANCER SCREENING: ICD-10-CM

## 2024-12-26 DIAGNOSIS — N40.0 BPH WITHOUT OBSTRUCTION/LOWER URINARY TRACT SYMPTOMS: ICD-10-CM

## 2024-12-26 PROBLEM — N13.8 BPH WITH OBSTRUCTION/LOWER URINARY TRACT SYMPTOMS: Status: ACTIVE | Noted: 2024-12-26

## 2024-12-26 PROBLEM — N40.1 BPH WITH OBSTRUCTION/LOWER URINARY TRACT SYMPTOMS: Status: ACTIVE | Noted: 2024-12-26

## 2024-12-26 LAB
SL AMB  POCT GLUCOSE, UA: NORMAL
SL AMB LEUKOCYTE ESTERASE,UA: NORMAL
SL AMB POCT BILIRUBIN,UA: NORMAL
SL AMB POCT BLOOD,UA: NORMAL
SL AMB POCT CLARITY,UA: CLEAR
SL AMB POCT COLOR,UA: YELLOW
SL AMB POCT KETONES,UA: NORMAL
SL AMB POCT NITRITE,UA: NORMAL
SL AMB POCT PH,UA: 5.5
SL AMB POCT SPECIFIC GRAVITY,UA: 1.01
SL AMB POCT URINE PROTEIN: NORMAL
SL AMB POCT UROBILINOGEN: 0.2

## 2024-12-26 PROCEDURE — 52000 CYSTOURETHROSCOPY: CPT | Performed by: UROLOGY

## 2024-12-26 PROCEDURE — 81003 URINALYSIS AUTO W/O SCOPE: CPT | Performed by: UROLOGY

## 2024-12-26 RX ORDER — SULFAMETHOXAZOLE AND TRIMETHOPRIM 800; 160 MG/1; MG/1
1 TABLET ORAL EVERY 12 HOURS SCHEDULED
Qty: 4 TABLET | Refills: 0 | Status: SHIPPED | OUTPATIENT
Start: 2024-12-26 | End: 2024-12-28

## 2024-12-26 RX ORDER — LEVOTHYROXINE SODIUM 175 UG/1
1 TABLET ORAL DAILY
COMMUNITY
Start: 2024-11-09

## 2024-12-26 RX ORDER — MULTIVIT-MIN/IRON/FOLIC ACID/K 18-600-40
CAPSULE ORAL
COMMUNITY

## 2024-12-26 NOTE — PROGRESS NOTES
Cystoscopy     Date/Time  12/26/2024 9:00 AM     Performed by  Javy Sahu MD   Authorized by  Javy Sahu MD     Universal Protocol:  Consent: Verbal consent obtained. Written consent obtained.  Risks and benefits: risks, benefits and alternatives were discussed  Consent given by: patient  Patient understanding: patient states understanding of the procedure being performed  Patient consent: the patient's understanding of the procedure matches consent given  Procedure consent: procedure consent matches procedure scheduled  Required items: required blood products, implants, devices, and special equipment available  Patient identity confirmed: verbally with patient      Procedure Details:  Procedure type: cystoscopy    Patient tolerance: Patient tolerated the procedure well with no immediate complications    Additional Procedure Details: Assessment/Plan:  #1.  BPH with lower   tract obstructive symptoms- AUA symptom score stable and low-patient satisfied with voiding pattern.     2.  History of bladder cancer- cystoscopy negative for recurrent lesions with scarring of the urinary bladder and sites of previous BCG therapy noted     3.  Prostate cancer screening- PSA 3.13  This will be repeated  in 1 year with COURTNEY.  Present COURTNEY not suspicious.     4 erectile dysfunction-responsive to sildenafil 20 mg on demand.  No problem-specific Assessment & Plan notes found for this encounter.        With the patient in supine position after placing 2% lidocaine lubricant per urethra and leaving indwelling for 5 minutes flexible video cystourethroscopy took place revealing a normal anterior urethra without stricture or lesion.  The prostatic urethra revealed bilobar enlargement of the lateral lobes of the prostate slight median lobe enlargement but no visual occlusion of the bladder outlet.  Urinary bladder was moderately trabeculated without intrinsic lesion or extrinsic mass compression.  Chronic cystic changes  were noted consistent with previous intravesical BCG.  Retroflexion reveals no lesions around the bladder neck.  The scope was removed atraumatically and the patient recovered uneventfully.

## 2024-12-26 NOTE — LETTER
December 26, 2024     Tamir Miller DO  [address] [facility] [address]    Patient: Emiliano Rae   YOB: 1955   Date of Visit: 12/26/2024       Dear Dr. Miller:    Thank you for referring Emiliano Rae to me for evaluation. Below are my notes for this consultation.    If you have questions, please do not hesitate to call me. I look forward to following your patient along with you.         Sincerely,        Javy Sahu MD        CC: No Recipients    Javy Sahu MD  12/26/2024  9:12 AM  Sign when Signing Visit     Cystoscopy     Date/Time  12/26/2024 9:00 AM     Performed by  Javy Sahu MD   Authorized by  Javy Sahu MD     Universal Protocol:  Consent: Verbal consent obtained. Written consent obtained.  Risks and benefits: risks, benefits and alternatives were discussed  Consent given by: patient  Patient understanding: patient states understanding of the procedure being performed  Patient consent: the patient's understanding of the procedure matches consent given  Procedure consent: procedure consent matches procedure scheduled  Required items: required blood products, implants, devices, and special equipment available  Patient identity confirmed: verbally with patient      Procedure Details:  Procedure type: cystoscopy    Patient tolerance: Patient tolerated the procedure well with no immediate complications    Additional Procedure Details: Assessment/Plan:  #1.  BPH with lower   tract obstructive symptoms- AUA symptom score stable and low-patient satisfied with voiding pattern.     2.  History of bladder cancer- cystoscopy negative for recurrent lesions with scarring of the urinary bladder and sites of previous BCG therapy noted     3.  Prostate cancer screening- PSA 3.13  This will be repeated  in 1 year with COURTNEY.  Present COURTNEY not suspicious.     4 erectile dysfunction-responsive to sildenafil 20 mg on demand.  No problem-specific Assessment & Plan notes  found for this encounter.        With the patient in supine position after placing 2% lidocaine lubricant per urethra and leaving indwelling for 5 minutes flexible video cystourethroscopy took place revealing a normal anterior urethra without stricture or lesion.  The prostatic urethra revealed bilobar enlargement of the lateral lobes of the prostate slight median lobe enlargement but no visual occlusion of the bladder outlet.  Urinary bladder was moderately trabeculated without intrinsic lesion or extrinsic mass compression.  Chronic cystic changes were noted consistent with previous intravesical BCG.  Retroflexion reveals no lesions around the bladder neck.  The scope was removed atraumatically and the patient recovered uneventfully.

## 2025-01-25 PROBLEM — Z12.5 PROSTATE CANCER SCREENING: Status: RESOLVED | Noted: 2024-12-26 | Resolved: 2025-01-25

## 2025-08-20 ENCOUNTER — TELEPHONE (OUTPATIENT)
Dept: UROLOGY | Facility: MEDICAL CENTER | Age: 70
End: 2025-08-20